# Patient Record
(demographics unavailable — no encounter records)

---

## 2024-11-17 NOTE — HISTORY OF PRESENT ILLNESS
[Mother] : mother [Father] : father [Cow's milk (Ounces per day ___)] : consumes [unfilled] oz of Cow's milk per day [Fruit] : fruit [Vegetables] : vegetables [Meat] : meat [Cereal] : cereal [Eggs] : eggs [Table food] : table food [___ stools per day] : [unfilled]  stools per day [Yellow] : stools are yellow color [Firm] : firm consistency [___ voids per day] : [unfilled] voids per day [In crib] : In crib [Wakes up at night] : Wakes up at night [Sippy cup use] : Sippy cup use [Brushing teeth] : Brushing teeth [Toothpaste] : Primary Fluoride Source: Toothpaste [Playtime] : Playtime  [Ready for Toilet Training] : ready for toilet training [No] : Not at  exposure [Water heater temperature set at <120 degrees F] : Water heater temperature set at <120 degrees F [Car seat in back seat] : Car seat in back seat [Carbon Monoxide Detectors] : Carbon monoxide detectors [Smoke Detectors] : Smoke detectors [Up to date] : Up to date [NO] : No [Exposure to electronic nicotine delivery system] : No exposure to electronic nicotine delivery system [FreeTextEntry7] : Stye developed a month ago. Went to urgent care, antibiotics for 5 days. Resolved. [de-identified] : 1. Pruritic skin. Known eczema on wrist and flexor surfaces. Itchy lower eyelid. Hydrocortisone does help but flare ups occur every week. Also has been itchy all over body for months, not limited to eczema plaques. 2. 6-10 hours of sleep per day. Child keeps waking up because of itchiness. Generally a very light sleeper and looks around for parental reassurance. 3. Speech regression. Only says mama and loida and no longer says "come" and "baby." 4. Afraid that baby is not gaining good weight due to poor appetite during the day. [de-identified] : Feeding routine is as follow. Has breakfast with rice/cereal/bread/yogurt. Gives banana and cookies after breakfast. Only one or two spoons of rice/chicken for lunch. Dinner with rice/yogurt/mangos. Does not enough protein or vegetables according to dad. Uses a spoon independently. [FreeTextEntry8] : Constipation once a week with pebbly stool. No bloody or pain. [FreeTextEntry3] : Good energy levels in the day. [de-identified] : Occasionally drinks from regular cup. [de-identified] : Would start dental care in January. [FreeTextEntry9] : Already trying and peeing in potty occasionally. [de-identified] : No flu or COVID.

## 2024-11-17 NOTE — END OF VISIT
[] : A student assisted with documenting this visit. I have reviewed and verified all information documented by the student, and made modifications to such information, when appropriate. [Time Spent: ___ minutes] : I have spent [unfilled] minutes of time on the encounter which excludes teaching and separately reported services.

## 2024-11-17 NOTE — PHYSICAL EXAM
[Alert] : alert [No Acute Distress] : no acute distress [Normocephalic] : normocephalic [Anterior [place] Closed] : anterior fontanelle closed [Red Reflex Bilateral] : red reflex bilateral [PERRL] : PERRL [Normally Placed Ears] : normally placed ears [Auricles Well Formed] : auricles well formed [Clear Tympanic membranes with present light reflex and bony landmarks] : clear tympanic membranes with present light reflex and bony landmarks [No Discharge] : no discharge [Nares Patent] : nares patent [Palate Intact] : palate intact [Uvula Midline] : uvula midline [Tooth Eruption] : tooth eruption  [Supple, full passive range of motion] : supple, full passive range of motion [No Palpable Masses] : no palpable masses [Symmetric Chest Rise] : symmetric chest rise [Clear to Auscultation Bilaterally] : clear to auscultation bilaterally [Regular Rate and Rhythm] : regular rate and rhythm [S1, S2 present] : S1, S2 present [No Murmurs] : no murmurs [+2 Femoral Pulses] : +2 femoral pulses [Soft] : soft [NonTender] : non tender [Non Distended] : non distended [Normoactive Bowel Sounds] : normoactive bowel sounds [No Hepatomegaly] : no hepatomegaly [No Splenomegaly] : no splenomegaly [Chema 1] : Chema 1 [No Clitoromegaly] : no clitoromegaly [Normal Vaginal Introitus] : normal vaginal introitus [Patent] : patent [Normally Placed] : normally placed [No Abnormal Lymph Nodes Palpated] : no abnormal lymph nodes palpated [No Clavicular Crepitus] : no clavicular crepitus [Symmetric Buttocks Creases] : symmetric buttocks creases [No Spinal Dimple] : no spinal dimple [NoTuft of Hair] : no tuft of hair [Cranial Nerves Grossly Intact] : cranial nerves grossly intact [de-identified] : Pink plaques on elbows. Hyperpigmentation on wrists and knees. Skin generally feels dry.

## 2024-11-17 NOTE — PHYSICAL EXAM
[Alert] : alert [No Acute Distress] : no acute distress [Normocephalic] : normocephalic [Anterior [place] Closed] : anterior fontanelle closed [Red Reflex Bilateral] : red reflex bilateral [PERRL] : PERRL [Normally Placed Ears] : normally placed ears [Auricles Well Formed] : auricles well formed [Clear Tympanic membranes with present light reflex and bony landmarks] : clear tympanic membranes with present light reflex and bony landmarks [No Discharge] : no discharge [Nares Patent] : nares patent [Palate Intact] : palate intact [Uvula Midline] : uvula midline [Tooth Eruption] : tooth eruption  [Supple, full passive range of motion] : supple, full passive range of motion [No Palpable Masses] : no palpable masses [Symmetric Chest Rise] : symmetric chest rise [Clear to Auscultation Bilaterally] : clear to auscultation bilaterally [Regular Rate and Rhythm] : regular rate and rhythm [S1, S2 present] : S1, S2 present [No Murmurs] : no murmurs [+2 Femoral Pulses] : +2 femoral pulses [Soft] : soft [NonTender] : non tender [Non Distended] : non distended [Normoactive Bowel Sounds] : normoactive bowel sounds [No Hepatomegaly] : no hepatomegaly [No Splenomegaly] : no splenomegaly [Chema 1] : Chema 1 [No Clitoromegaly] : no clitoromegaly [Normal Vaginal Introitus] : normal vaginal introitus [Patent] : patent [Normally Placed] : normally placed [No Abnormal Lymph Nodes Palpated] : no abnormal lymph nodes palpated [No Clavicular Crepitus] : no clavicular crepitus [Symmetric Buttocks Creases] : symmetric buttocks creases [No Spinal Dimple] : no spinal dimple [NoTuft of Hair] : no tuft of hair [Cranial Nerves Grossly Intact] : cranial nerves grossly intact [de-identified] : Pink plaques on elbows. Hyperpigmentation on wrists and knees. Skin generally feels dry.

## 2024-11-17 NOTE — DEVELOPMENTAL MILESTONES
[Yes: _______] : yes, [unfilled] [Engages with others for play] : engages with others for play [Help dress and undress self] : help dress and undress self [Points to pictures in book] : points to pictures in book [Points to object of interest to] : points to object of interest to draw attention to it [Turns and looks at adult if] : turns and looks at adult if something new happens [Begins to scoop with spoon] : begins to scoop with spoon [Walks up with 2 feet per step] : walks up with 2 feet per step with hand held [Sits in small chair] : sits in small chair [Carries toy while walking] : carries toy while walking [Scribbles spontaneously] : scribbles spontaneously [Throws small ball a few feet] : throws a small ball a few feet while standing [Passed] : passed [Uses 6 to 10 words other than] : does not use 6 to 10 words other than names [Identifies at least 2 body parts] : does not indentify at least 2 body parts

## 2024-11-17 NOTE — DISCUSSION/SUMMARY
[None] : No known medical problems [Normal Sleep Pattern] : sleep [Delayed-Normal For Gest Age] : Development delayed but normal for patient's gestational age [Delayed Language Skills] : delayed language skills [Constipation] : constipation [Picky Eater] : picky eater [Eczema] : eczema [Mother] : mother [Father] : father [Poor Weight Gain] : poor weight gain [Family Support] : family support [Child Development and Behavior] : child development and behavior [Language Promotion/Hearing] : language promotion/hearing [Toliet Training Readiness] : toliet training readiness [Safety] : safety [de-identified] : Referral to hearing/speech specialist and early intervention. [FreeTextEntry1] : 18 m F here for [facility] coming in for 18 month old [facility]. Has decreased weight percentiles, but still with varied diet. Noted to have expressive speech delay, otherwise developing well. She is energetic, pleasant and curious about her surroundings.  # eczema Reinforced moisturizers with Vaseline instead of Cerave for at least 3 times per day (can increase to 5 times a day if needed to keep skin moist). Parents already using non hypoallergenic detergent and body wash. Use hydrocortisone cream twice a day for a week on pink ezcema plaques. If this fails, do the same regimen for triamcinolone. Reconsult if treatment fails after a month of adherence. Recommend scheduling follow-up with dermatology.   # sleep Reassured mom that baby is getting adequate sleep and root cause of sleeping issue is itchiness. Will continue to monitor on subsequent visits.    #Expressive Speech Delay:   - Discussed importance of verbalizing around child as much as possible and reading everyday to them  - Hearing and speech referral  - EI number given  # feeding Encouraged high calorie dense foods and incorporating food that patient dislikes into desired foods.  # vaccines Given VZV and blood work for CBC and lead. Flu vaccine declined on today's visit  #HCM:   - Next appointment for 2 yr [facility] or sooner if concerns  - Discussed importance of establishing dental care, list of pediatric dentists given  - Reach out and read book given  - Desmond Yeoh MS3

## 2024-11-17 NOTE — DISCUSSION/SUMMARY
[None] : No known medical problems [Normal Sleep Pattern] : sleep [Delayed-Normal For Gest Age] : Development delayed but normal for patient's gestational age [Delayed Language Skills] : delayed language skills [Constipation] : constipation [Picky Eater] : picky eater [Eczema] : eczema [Mother] : mother [Father] : father [Poor Weight Gain] : poor weight gain [Family Support] : family support [Child Development and Behavior] : child development and behavior [Language Promotion/Hearing] : language promotion/hearing [Toliet Training Readiness] : toliet training readiness [Safety] : safety [de-identified] : Referral to hearing/speech specialist and early intervention. [FreeTextEntry1] : 18 m F here for [facility] coming in for 18 month old [facility]. Has decreased weight percentiles, but still with varied diet. Noted to have expressive speech delay, otherwise developing well. She is energetic, pleasant and curious about her surroundings.  # eczema Reinforced moisturizers with Vaseline instead of Cerave for at least 3 times per day (can increase to 5 times a day if needed to keep skin moist). Parents already using non hypoallergenic detergent and body wash. Use hydrocortisone cream twice a day for a week on pink ezcema plaques. If this fails, do the same regimen for triamcinolone. Reconsult if treatment fails after a month of adherence. Recommend scheduling follow-up with dermatology.   # sleep Reassured mom that baby is getting adequate sleep and root cause of sleeping issue is itchiness. Will continue to monitor on subsequent visits.    #Expressive Speech Delay:   - Discussed importance of verbalizing around child as much as possible and reading everyday to them  - Hearing and speech referral  - EI number given  # feeding Encouraged high calorie dense foods and incorporating food that patient dislikes into desired foods.  # vaccines Given VZV and blood work for CBC and lead. Flu vaccine declined on today's visit  #HCM:   - Next appointment for 2 yr [facility] or sooner if concerns  - Discussed importance of establishing dental care, list of pediatric dentists given  - Reach out and read book given  - Desmond Yeoh MS3

## 2024-11-17 NOTE — HISTORY OF PRESENT ILLNESS
[Mother] : mother [Father] : father [Cow's milk (Ounces per day ___)] : consumes [unfilled] oz of Cow's milk per day [Fruit] : fruit [Vegetables] : vegetables [Meat] : meat [Cereal] : cereal [Eggs] : eggs [Table food] : table food [___ stools per day] : [unfilled]  stools per day [Yellow] : stools are yellow color [Firm] : firm consistency [___ voids per day] : [unfilled] voids per day [In crib] : In crib [Wakes up at night] : Wakes up at night [Sippy cup use] : Sippy cup use [Brushing teeth] : Brushing teeth [Toothpaste] : Primary Fluoride Source: Toothpaste [Playtime] : Playtime  [Ready for Toilet Training] : ready for toilet training [No] : Not at  exposure [Water heater temperature set at <120 degrees F] : Water heater temperature set at <120 degrees F [Car seat in back seat] : Car seat in back seat [Carbon Monoxide Detectors] : Carbon monoxide detectors [Smoke Detectors] : Smoke detectors [Up to date] : Up to date [NO] : No [Exposure to electronic nicotine delivery system] : No exposure to electronic nicotine delivery system [FreeTextEntry7] : Stye developed a month ago. Went to urgent care, antibiotics for 5 days. Resolved. [de-identified] : 1. Pruritic skin. Known eczema on wrist and flexor surfaces. Itchy lower eyelid. Hydrocortisone does help but flare ups occur every week. Also has been itchy all over body for months, not limited to eczema plaques. 2. 6-10 hours of sleep per day. Child keeps waking up because of itchiness. Generally a very light sleeper and looks around for parental reassurance. 3. Speech regression. Only says mama and loida and no longer says "come" and "baby." 4. Afraid that baby is not gaining good weight due to poor appetite during the day. [de-identified] : Feeding routine is as follow. Has breakfast with rice/cereal/bread/yogurt. Gives banana and cookies after breakfast. Only one or two spoons of rice/chicken for lunch. Dinner with rice/yogurt/mangos. Does not enough protein or vegetables according to dad. Uses a spoon independently. [FreeTextEntry8] : Constipation once a week with pebbly stool. No bloody or pain. [FreeTextEntry3] : Good energy levels in the day. [de-identified] : Occasionally drinks from regular cup. [de-identified] : Would start dental care in January. [FreeTextEntry9] : Already trying and peeing in potty occasionally. [de-identified] : No flu or COVID.